# Patient Record
Sex: FEMALE | Race: WHITE
[De-identification: names, ages, dates, MRNs, and addresses within clinical notes are randomized per-mention and may not be internally consistent; named-entity substitution may affect disease eponyms.]

---

## 2017-02-02 ENCOUNTER — HOSPITAL ENCOUNTER (OUTPATIENT)
Dept: HOSPITAL 17 - HRAD | Age: 57
Discharge: HOME | End: 2017-02-02
Attending: SURGERY
Payer: COMMERCIAL

## 2017-02-02 VITALS — WEIGHT: 195.33 LBS | HEIGHT: 66 IN | BODY MASS INDEX: 31.39 KG/M2

## 2017-02-02 VITALS
RESPIRATION RATE: 16 BRPM | OXYGEN SATURATION: 100 % | DIASTOLIC BLOOD PRESSURE: 60 MMHG | SYSTOLIC BLOOD PRESSURE: 101 MMHG | HEART RATE: 61 BPM

## 2017-02-02 VITALS
HEART RATE: 61 BPM | DIASTOLIC BLOOD PRESSURE: 60 MMHG | OXYGEN SATURATION: 100 % | RESPIRATION RATE: 16 BRPM | SYSTOLIC BLOOD PRESSURE: 101 MMHG

## 2017-02-02 VITALS
SYSTOLIC BLOOD PRESSURE: 123 MMHG | TEMPERATURE: 98.3 F | DIASTOLIC BLOOD PRESSURE: 79 MMHG | OXYGEN SATURATION: 95 % | HEART RATE: 87 BPM | RESPIRATION RATE: 20 BRPM

## 2017-02-02 VITALS
HEART RATE: 68 BPM | SYSTOLIC BLOOD PRESSURE: 99 MMHG | DIASTOLIC BLOOD PRESSURE: 62 MMHG | OXYGEN SATURATION: 100 % | RESPIRATION RATE: 16 BRPM

## 2017-02-02 VITALS
TEMPERATURE: 97.6 F | OXYGEN SATURATION: 100 % | SYSTOLIC BLOOD PRESSURE: 108 MMHG | RESPIRATION RATE: 16 BRPM | HEART RATE: 66 BPM | DIASTOLIC BLOOD PRESSURE: 66 MMHG

## 2017-02-02 DIAGNOSIS — L76.34: Primary | ICD-10-CM

## 2017-02-02 PROCEDURE — 99153 MOD SED SAME PHYS/QHP EA: CPT

## 2017-02-02 PROCEDURE — 10022: CPT

## 2017-02-02 PROCEDURE — C1729 CATH, DRAINAGE: HCPCS

## 2017-02-02 PROCEDURE — 77012 CT SCAN FOR NEEDLE BIOPSY: CPT

## 2017-02-02 PROCEDURE — 99152 MOD SED SAME PHYS/QHP 5/>YRS: CPT

## 2017-02-02 NOTE — RADRPT
EXAM DATE/TIME:  02/02/2017 08:43 

 

[facility] COMPARISON:     

No previous studies available for comparison.

 

 

INDICATIONS :      

Abdominal seroma.

                     

 

 

SEDATION TIME:       

45 minutes

                     

 

MEDICATION(S):

1.) 3 mg midazolam (Versed) IV  

2.) 150 mcg fentanyl (Sublimaze) IV  

 

 

DEVICE(S):

1.) 14 Fr Skater

2.) 18 gauge 10 cm Woodward needle 

                     

 

FLUID: 

Total volume of 1000 cc of viscous reddish black fluid was removed.

Fluid was discarded.

                     

 

MEDICAL HISTORY :        

Endometrial cancer, incisional hernia

 

SURGICAL HISTORY :     

Hysterectomy.   Incisional hernia repair

 

ENCOUNTER:     

Initial

 

ACUITY:     

1 week

 

PAIN SCORE:     

4/10

 

LOCATION:     

Bilateral abdomen

                     

 

 

PROCEDURE:

1.)   Conscious sedation with continuous EKG and oximetry monitoring.

 

PROCEDURE :     

CT guided aspiration of ventral wall seroma

 

The risks, benefits and alternatives to the procedure were explained and verbal and written consent w
as obtained.  The site was prepped in sterile fashion.  Full sterile technique was used, including ca
p, mask, sterile gloves and gown and a large sterile sheet.  Hand hygiene and 2% chlorhexidine and/or
 betadine/alcohol prep was utilized per protocol for cutaneous antisepsis.  The skin and subcutaneous
 tissues were infiltrated with local anesthetic solution.

 

Needle was placed within the ventral wall seroma. Thick reddish black fluid aspirated. A 14 gauge cat
heter was placed within the stroma due to the viscosity the fluid. The seroma eventually drained and 
decompressed. Catheter was removed. Patient tolerated the procedure without subluxations.

 

CONCLUSION:     Uncomplicated drainage of ventral wall seroma as above.

 

 

 

 Saeed Suazo MD on February 02, 2017 at 15:21           

Board Certified Radiologist.

 This report was verified electronically.

## 2017-03-30 ENCOUNTER — HOSPITAL ENCOUNTER (OUTPATIENT)
Dept: HOSPITAL 17 - HRAD | Age: 57
Discharge: HOME | End: 2017-03-30
Attending: SURGERY
Payer: COMMERCIAL

## 2017-03-30 VITALS
SYSTOLIC BLOOD PRESSURE: 133 MMHG | HEART RATE: 78 BPM | RESPIRATION RATE: 18 BRPM | DIASTOLIC BLOOD PRESSURE: 78 MMHG | TEMPERATURE: 97 F | OXYGEN SATURATION: 97 %

## 2017-03-30 DIAGNOSIS — L76.82: Primary | ICD-10-CM

## 2017-03-30 DIAGNOSIS — Z85.42: ICD-10-CM

## 2017-03-30 PROCEDURE — 76942 ECHO GUIDE FOR BIOPSY: CPT

## 2017-03-30 PROCEDURE — C1729 CATH, DRAINAGE: HCPCS

## 2017-03-30 PROCEDURE — 10160 PNXR ASPIR ABSC HMTMA BULLA: CPT

## 2017-03-31 NOTE — RADRPT
EXAM DATE/TIME:  03/30/2017 10:16 

 

[facility] COMPARISON:     

No previous studies available for comparison.

 

 

INDICATIONS :      

Abdominal wall seroma.

                 

                 

 

 

 

MEDICAL HISTORY :        

Endometrial cancer.

 

SURGICAL HISTORY :        

Hysterectomy. Hernia repair. Lysis adhesion removal. 

 

ENCOUNTER:     

Subsequent

 

ACUITY:     

2 weeks

 

PAIN SCORE:     

1/10

 

LOCATION:      

Midline abdominal wall. 

                 

 

FLUID:

Total volume of 230 cc of dark brown. fluid was removed.

Fluid was discarded. 

Post procedure scanning reveals no hematoma or other complication.

 

 

TECHNIQUE:  

1. Ultrasound guidance for needle aspiration.

2. Aspiration.

 

The risks, benefits, and alternatives to ultrasound guided aspiration were explained to the patient i
n detail including the risk of bleeding and infection.  Written and verbal informed consent was obtai
peggy.  

 

With the patient on the ultrasound table, ultrasound imaging was used to select the most appropriate 
approach for aspiration.  Overlying skin was prepped and draped in the usual sterile fashion and with
 local anesthetic a dermatotomy was made with an 11 blade scalpel.  A catheter was introduced into th
e cavity and fluid was collected.   

 

CONCLUSION:     

Uncomplicated ultrasound guided aspiration.  

 

 

 

 Chacorta Allen MD on March 31, 2017 at 11:43           

Board Certified Radiologist.

 This report was verified electronically.

## 2017-07-19 ENCOUNTER — HOSPITAL ENCOUNTER (OUTPATIENT)
Dept: HOSPITAL 17 - HRAD | Age: 57
Discharge: HOME | End: 2017-07-19
Attending: SURGERY
Payer: COMMERCIAL

## 2017-07-19 VITALS
RESPIRATION RATE: 20 BRPM | OXYGEN SATURATION: 99 % | SYSTOLIC BLOOD PRESSURE: 104 MMHG | HEART RATE: 60 BPM | DIASTOLIC BLOOD PRESSURE: 62 MMHG

## 2017-07-19 VITALS
SYSTOLIC BLOOD PRESSURE: 110 MMHG | OXYGEN SATURATION: 99 % | HEART RATE: 54 BPM | TEMPERATURE: 96.4 F | RESPIRATION RATE: 20 BRPM | DIASTOLIC BLOOD PRESSURE: 70 MMHG

## 2017-07-19 VITALS
DIASTOLIC BLOOD PRESSURE: 86 MMHG | TEMPERATURE: 96.8 F | RESPIRATION RATE: 16 BRPM | SYSTOLIC BLOOD PRESSURE: 132 MMHG | HEART RATE: 80 BPM | OXYGEN SATURATION: 97 %

## 2017-07-19 DIAGNOSIS — Z92.21: ICD-10-CM

## 2017-07-19 DIAGNOSIS — Z85.42: ICD-10-CM

## 2017-07-19 DIAGNOSIS — Z90.710: ICD-10-CM

## 2017-07-19 DIAGNOSIS — N99.842: ICD-10-CM
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
## 2017-07-19 DIAGNOSIS — T88.8XXA: Primary | ICD-10-CM

## 2017-07-19 PROCEDURE — 75989 ABSCESS DRAINAGE UNDER X-RAY: CPT

## 2017-07-19 PROCEDURE — 10160 PNXR ASPIR ABSC HMTMA BULLA: CPT

## 2017-07-19 PROCEDURE — C1769 GUIDE WIRE: HCPCS

## 2017-07-19 PROCEDURE — C1729 CATH, DRAINAGE: HCPCS

## 2017-07-19 NOTE — RADRPT
EXAM DATE/TIME:  07/19/2017 10:57 

 

[facility] COMPARISON:     

No previous studies available for comparison.

 

EXTERNAL COMPARISON:  

[facility], CT ABDOMEN AND PELVIS , Dec 8 2016

 

 

INDICATIONS :      

Abdominal seroma.

 

 

MEDICAL HISTORY :        

Abdominal seroma. Endometrial cancer. Chemotherapy. 

 

SURGICAL HISTORY :     

Hysterectomy.   

 

ENCOUNTER:     

Subsequent

 

ACUITY:     

3 months

 

PAIN SCORE:     

2/10

 

LOCATION:      

Abdomen.

                     

 

FLUID:

Total volume of 35 cc of cloudy, red fluid was removed.

Fluid was discarded. 

Post procedure scanning reveals no hematoma or other complication.

 

 

TECHNIQUE:  

1.  Ultrasound guidance for abscess drainage.

2.  Abscess drainage.

 

The risks, benefits, and alternatives to ultrasound guided abscess drainage were explained to the [facility]
ient in detail including the risk of bleeding and infection.  Written and verbal informed consent was
 obtained.  

 

With the patient on the ultrasound table, ultrasound imaging was used to select the most appropriate 
approach for drainage.  Overlying skin was prepped and draped in the usual sterile fashion and with l
ocal anesthetic a dermatotomy was made with an 11 blade scalpel.  A 7 Tanzanian pigtail catheter was int
roduced into the cavity and 35 cc of serosanguineous fluid was collected. Iodine solution was injecte
d into the seroma which was eventually evacuated. The 7 Tanzanian pigtail catheter was then removed.

 

Post procedure scanning reveals no hematoma or other complication. 

The patient tolerated the procedure well and the left the ultrasound suite in stable condition.

 

CONCLUSION:     

Uncomplicated placement of a 7 Tanzanian pigtail catheter within the abdominal wall seroma. Sclerosing a
gent was then administered. The pigtail catheter and sclerosing agent was then evacuated.  

 

 

 

 Saeed Suazo MD on July 19, 2017 at 15:41           

Board Certified Radiologist.

 This report was verified electronically.

## 2018-02-01 ENCOUNTER — HOSPITAL ENCOUNTER (OUTPATIENT)
Dept: HOSPITAL 17 - CPRE | Age: 58
End: 2018-02-01
Attending: COLON & RECTAL SURGERY
Payer: COMMERCIAL

## 2018-02-01 DIAGNOSIS — C20: ICD-10-CM

## 2018-02-01 DIAGNOSIS — Z01.812: Primary | ICD-10-CM

## 2018-02-01 DIAGNOSIS — R82.99: ICD-10-CM

## 2018-02-01 LAB
BACTERIA #/AREA URNS HPF: (no result) /HPF
COLOR UR: YELLOW
GLUCOSE UR STRIP-MCNC: (no result) MG/DL
HGB UR QL STRIP: (no result)
KETONES UR STRIP-MCNC: (no result) MG/DL
MUCOUS THREADS #/AREA URNS LPF: (no result) /LPF
NITRITE UR QL STRIP: (no result)
SP GR UR STRIP: 1.03 (ref 1–1.03)
SQUAMOUS #/AREA URNS HPF: 2 /HPF (ref 0–5)
URINE LEUKOCYTE ESTERASE: (no result)

## 2018-02-01 PROCEDURE — 82378 CARCINOEMBRYONIC ANTIGEN: CPT

## 2018-02-01 PROCEDURE — 87086 URINE CULTURE/COLONY COUNT: CPT

## 2018-02-01 PROCEDURE — 81001 URINALYSIS AUTO W/SCOPE: CPT

## 2018-02-01 PROCEDURE — 36415 COLL VENOUS BLD VENIPUNCTURE: CPT

## 2018-02-09 ENCOUNTER — HOSPITAL ENCOUNTER (INPATIENT)
Dept: HOSPITAL 17 - HSDI | Age: 58
LOS: 4 days | Discharge: HOME HEALTH | DRG: 331 | End: 2018-02-13
Attending: COLON & RECTAL SURGERY | Admitting: COLON & RECTAL SURGERY
Payer: COMMERCIAL

## 2018-02-09 VITALS
TEMPERATURE: 98.1 F | OXYGEN SATURATION: 94 % | HEART RATE: 95 BPM | RESPIRATION RATE: 20 BRPM | SYSTOLIC BLOOD PRESSURE: 130 MMHG | DIASTOLIC BLOOD PRESSURE: 62 MMHG

## 2018-02-09 VITALS
HEART RATE: 95 BPM | SYSTOLIC BLOOD PRESSURE: 125 MMHG | RESPIRATION RATE: 16 BRPM | DIASTOLIC BLOOD PRESSURE: 62 MMHG | OXYGEN SATURATION: 99 %

## 2018-02-09 VITALS — HEIGHT: 66 IN | BODY MASS INDEX: 29.94 KG/M2 | WEIGHT: 186.29 LBS

## 2018-02-09 DIAGNOSIS — C20: Primary | ICD-10-CM

## 2018-02-09 LAB
BASOPHILS # BLD AUTO: 0 TH/MM3 (ref 0–0.2)
BASOPHILS NFR BLD: 0.1 % (ref 0–2)
BUN SERPL-MCNC: 12 MG/DL (ref 7–18)
CALCIUM SERPL-MCNC: 7.5 MG/DL (ref 8.5–10.1)
CHLORIDE SERPL-SCNC: 105 MEQ/L (ref 98–107)
CREAT SERPL-MCNC: 0.77 MG/DL (ref 0.5–1)
EOSINOPHIL # BLD: 0 TH/MM3 (ref 0–0.4)
EOSINOPHIL NFR BLD: 0 % (ref 0–4)
ERYTHROCYTE [DISTWIDTH] IN BLOOD BY AUTOMATED COUNT: 14.8 % (ref 11.6–17.2)
GFR SERPLBLD BASED ON 1.73 SQ M-ARVRAT: 78 ML/MIN (ref 89–?)
GLUCOSE SERPL-MCNC: 150 MG/DL (ref 74–106)
HCO3 BLD-SCNC: 23.2 MEQ/L (ref 21–32)
HCT VFR BLD CALC: 36.9 % (ref 35–46)
HGB BLD-MCNC: 12.4 GM/DL (ref 11.6–15.3)
LYMPHOCYTES # BLD AUTO: 0.4 TH/MM3 (ref 1–4.8)
LYMPHOCYTES NFR BLD AUTO: 2.2 % (ref 9–44)
MCH RBC QN AUTO: 30.9 PG (ref 27–34)
MCHC RBC AUTO-ENTMCNC: 33.7 % (ref 32–36)
MCV RBC AUTO: 91.7 FL (ref 80–100)
MONOCYTE #: 0.2 TH/MM3 (ref 0–0.9)
MONOCYTES NFR BLD: 1.2 % (ref 0–8)
NEUTROPHILS # BLD AUTO: 19.6 TH/MM3 (ref 1.8–7.7)
NEUTROPHILS NFR BLD AUTO: 96.5 % (ref 16–70)
PLATELET # BLD: 324 TH/MM3 (ref 150–450)
PMV BLD AUTO: 7 FL (ref 7–11)
RBC # BLD AUTO: 4.03 MIL/MM3 (ref 4–5.3)
SODIUM SERPL-SCNC: 139 MEQ/L (ref 136–145)
WBC # BLD AUTO: 20.3 TH/MM3 (ref 4–11)

## 2018-02-09 PROCEDURE — 85025 COMPLETE CBC W/AUTO DIFF WBC: CPT

## 2018-02-09 PROCEDURE — 0T9B70Z DRAINAGE OF BLADDER WITH DRAINAGE DEVICE, VIA NATURAL OR ARTIFICIAL OPENING: ICD-10-PCS

## 2018-02-09 PROCEDURE — 86850 RBC ANTIBODY SCREEN: CPT

## 2018-02-09 PROCEDURE — 0T788DZ DILATION OF BILATERAL URETERS WITH INTRALUMINAL DEVICE, VIA NATURAL OR ARTIFICIAL OPENING ENDOSCOPIC: ICD-10-PCS

## 2018-02-09 PROCEDURE — 0UBG4ZX EXCISION OF VAGINA, PERCUTANEOUS ENDOSCOPIC APPROACH, DIAGNOSTIC: ICD-10-PCS | Performed by: COLON & RECTAL SURGERY

## 2018-02-09 PROCEDURE — 0DBN4ZZ EXCISION OF SIGMOID COLON, PERCUTANEOUS ENDOSCOPIC APPROACH: ICD-10-PCS | Performed by: COLON & RECTAL SURGERY

## 2018-02-09 PROCEDURE — 86901 BLOOD TYPING SEROLOGIC RH(D): CPT

## 2018-02-09 PROCEDURE — 88309 TISSUE EXAM BY PATHOLOGIST: CPT

## 2018-02-09 PROCEDURE — 07BC4ZX EXCISION OF PELVIS LYMPHATIC, PERCUTANEOUS ENDOSCOPIC APPROACH, DIAGNOSTIC: ICD-10-PCS | Performed by: COLON & RECTAL SURGERY

## 2018-02-09 PROCEDURE — 86900 BLOOD TYPING SEROLOGIC ABO: CPT

## 2018-02-09 PROCEDURE — 0DBQ4ZZ EXCISION OF ANUS, PERCUTANEOUS ENDOSCOPIC APPROACH: ICD-10-PCS | Performed by: COLON & RECTAL SURGERY

## 2018-02-09 PROCEDURE — 0DTP4ZZ RESECTION OF RECTUM, PERCUTANEOUS ENDOSCOPIC APPROACH: ICD-10-PCS | Performed by: COLON & RECTAL SURGERY

## 2018-02-09 PROCEDURE — 94150 VITAL CAPACITY TEST: CPT

## 2018-02-09 PROCEDURE — 80048 BASIC METABOLIC PNL TOTAL CA: CPT

## 2018-02-09 PROCEDURE — C9113 INJ PANTOPRAZOLE SODIUM, VIA: HCPCS

## 2018-02-09 PROCEDURE — 8E0W4CZ ROBOTIC ASSISTED PROCEDURE OF TRUNK REGION, PERCUTANEOUS ENDOSCOPIC APPROACH: ICD-10-PCS | Performed by: COLON & RECTAL SURGERY

## 2018-02-09 PROCEDURE — 84155 ASSAY OF PROTEIN SERUM: CPT

## 2018-02-09 PROCEDURE — 0D1N0Z4 BYPASS SIGMOID COLON TO CUTANEOUS, OPEN APPROACH: ICD-10-PCS | Performed by: COLON & RECTAL SURGERY

## 2018-02-09 RX ADMIN — CLONIDINE HYDROCHLORIDE SCH MG: 0.1 INJECTION, SOLUTION EPIDURAL at 22:22

## 2018-02-09 RX ADMIN — DEXTROSE MONOHYDRATE, SODIUM CHLORIDE, AND POTASSIUM CHLORIDE SCH MLS/HR: 50; 9; 1.49 INJECTION, SOLUTION INTRAVENOUS at 22:35

## 2018-02-09 NOTE — PD.OP
__________________________________________________





Operative Report


Date of Surgery:  Feb 9, 2018


Preoperative Diagnosis:  


Rectal cancer


Postoperative Diagnosis:  


Same


Procedure:


Cystoscopy with bilateral ureteral catheter insertion


Anesthesia:


AUSTEN


Surgeon:


Rinku Meyers


Assistant(s):


None


Resident Surgeon:


None


Operation and Findings:


57-year-old female with evidence of rectal cancer who is undergoing robotic 

anterior perineal resection by Dr. De La O.  Request made for bilateral 

ureteral catheter placement.  The patient is brought to the operating room and 

was placed in the dorsal lithotomy position.  She was prepped and draped in 

usual sterile fashion, preprocedure antibiotics were administered, and general 

endotracheal tube anesthesia was administered.  22 Togolese cystoscope was 

inserted in the bladder pan cystoscopy did not reveal any abnormalities.  The 

left ureteral orifice was identified and a 5 Togolese catheter was inserted into 

the left ureteral orifice without difficulty.  This was again repeated on the 

right side without difficulty.  A Vitale was inserted and the catheters were 

attached to the Vitale.  She tolerated procedure well.











Rinku Meyers DO Feb 9, 2018 16:25

## 2018-02-10 VITALS
HEART RATE: 75 BPM | TEMPERATURE: 98 F | OXYGEN SATURATION: 98 % | DIASTOLIC BLOOD PRESSURE: 51 MMHG | SYSTOLIC BLOOD PRESSURE: 108 MMHG

## 2018-02-10 VITALS
SYSTOLIC BLOOD PRESSURE: 117 MMHG | OXYGEN SATURATION: 99 % | RESPIRATION RATE: 19 BRPM | HEART RATE: 83 BPM | DIASTOLIC BLOOD PRESSURE: 59 MMHG

## 2018-02-10 VITALS
HEART RATE: 87 BPM | OXYGEN SATURATION: 98 % | RESPIRATION RATE: 18 BRPM | DIASTOLIC BLOOD PRESSURE: 60 MMHG | SYSTOLIC BLOOD PRESSURE: 109 MMHG

## 2018-02-10 VITALS
SYSTOLIC BLOOD PRESSURE: 106 MMHG | TEMPERATURE: 98.2 F | OXYGEN SATURATION: 96 % | DIASTOLIC BLOOD PRESSURE: 56 MMHG | HEART RATE: 65 BPM

## 2018-02-10 VITALS
RESPIRATION RATE: 17 BRPM | SYSTOLIC BLOOD PRESSURE: 131 MMHG | OXYGEN SATURATION: 99 % | HEART RATE: 71 BPM | DIASTOLIC BLOOD PRESSURE: 62 MMHG

## 2018-02-10 VITALS
OXYGEN SATURATION: 99 % | RESPIRATION RATE: 18 BRPM | DIASTOLIC BLOOD PRESSURE: 60 MMHG | SYSTOLIC BLOOD PRESSURE: 120 MMHG | HEART RATE: 73 BPM

## 2018-02-10 VITALS
RESPIRATION RATE: 18 BRPM | TEMPERATURE: 97.8 F | OXYGEN SATURATION: 97 % | DIASTOLIC BLOOD PRESSURE: 54 MMHG | HEART RATE: 73 BPM | SYSTOLIC BLOOD PRESSURE: 95 MMHG

## 2018-02-10 VITALS
DIASTOLIC BLOOD PRESSURE: 59 MMHG | HEART RATE: 71 BPM | OXYGEN SATURATION: 97 % | SYSTOLIC BLOOD PRESSURE: 118 MMHG | RESPIRATION RATE: 16 BRPM

## 2018-02-10 VITALS
OXYGEN SATURATION: 97 % | HEART RATE: 77 BPM | TEMPERATURE: 97.7 F | RESPIRATION RATE: 18 BRPM | DIASTOLIC BLOOD PRESSURE: 59 MMHG | SYSTOLIC BLOOD PRESSURE: 109 MMHG

## 2018-02-10 VITALS
RESPIRATION RATE: 16 BRPM | SYSTOLIC BLOOD PRESSURE: 91 MMHG | HEART RATE: 74 BPM | OXYGEN SATURATION: 97 % | DIASTOLIC BLOOD PRESSURE: 64 MMHG | TEMPERATURE: 98 F

## 2018-02-10 VITALS
OXYGEN SATURATION: 94 % | DIASTOLIC BLOOD PRESSURE: 59 MMHG | SYSTOLIC BLOOD PRESSURE: 120 MMHG | RESPIRATION RATE: 20 BRPM | TEMPERATURE: 97.8 F | HEART RATE: 70 BPM

## 2018-02-10 VITALS
HEART RATE: 80 BPM | RESPIRATION RATE: 16 BRPM | OXYGEN SATURATION: 99 % | SYSTOLIC BLOOD PRESSURE: 113 MMHG | DIASTOLIC BLOOD PRESSURE: 57 MMHG

## 2018-02-10 VITALS
SYSTOLIC BLOOD PRESSURE: 128 MMHG | RESPIRATION RATE: 18 BRPM | OXYGEN SATURATION: 97 % | HEART RATE: 79 BPM | DIASTOLIC BLOOD PRESSURE: 61 MMHG

## 2018-02-10 VITALS
RESPIRATION RATE: 16 BRPM | SYSTOLIC BLOOD PRESSURE: 119 MMHG | DIASTOLIC BLOOD PRESSURE: 59 MMHG | OXYGEN SATURATION: 98 % | HEART RATE: 72 BPM

## 2018-02-10 VITALS
DIASTOLIC BLOOD PRESSURE: 63 MMHG | SYSTOLIC BLOOD PRESSURE: 120 MMHG | HEART RATE: 83 BPM | OXYGEN SATURATION: 100 % | RESPIRATION RATE: 20 BRPM

## 2018-02-10 VITALS — OXYGEN SATURATION: 99 %

## 2018-02-10 LAB
BASOPHILS # BLD AUTO: 0 TH/MM3 (ref 0–0.2)
BASOPHILS NFR BLD: 0.1 % (ref 0–2)
BUN SERPL-MCNC: 11 MG/DL (ref 7–18)
CALCIUM SERPL-MCNC: 7.8 MG/DL (ref 8.5–10.1)
CHLORIDE SERPL-SCNC: 106 MEQ/L (ref 98–107)
CREAT SERPL-MCNC: 0.86 MG/DL (ref 0.5–1)
EOSINOPHIL # BLD: 0 TH/MM3 (ref 0–0.4)
EOSINOPHIL NFR BLD: 0 % (ref 0–4)
ERYTHROCYTE [DISTWIDTH] IN BLOOD BY AUTOMATED COUNT: 14.7 % (ref 11.6–17.2)
GFR SERPLBLD BASED ON 1.73 SQ M-ARVRAT: 68 ML/MIN (ref 89–?)
GLUCOSE SERPL-MCNC: 183 MG/DL (ref 74–106)
HCO3 BLD-SCNC: 25.7 MEQ/L (ref 21–32)
HCT VFR BLD CALC: 34.6 % (ref 35–46)
HGB BLD-MCNC: 12 GM/DL (ref 11.6–15.3)
LYMPHOCYTES # BLD AUTO: 0.4 TH/MM3 (ref 1–4.8)
LYMPHOCYTES NFR BLD AUTO: 2.4 % (ref 9–44)
MCH RBC QN AUTO: 31.7 PG (ref 27–34)
MCHC RBC AUTO-ENTMCNC: 34.6 % (ref 32–36)
MCV RBC AUTO: 91.8 FL (ref 80–100)
MONOCYTE #: 0.5 TH/MM3 (ref 0–0.9)
MONOCYTES NFR BLD: 3.3 % (ref 0–8)
NEUTROPHILS # BLD AUTO: 14.9 TH/MM3 (ref 1.8–7.7)
NEUTROPHILS NFR BLD AUTO: 94.2 % (ref 16–70)
PLATELET # BLD: 310 TH/MM3 (ref 150–450)
PMV BLD AUTO: 7.1 FL (ref 7–11)
RBC # BLD AUTO: 3.77 MIL/MM3 (ref 4–5.3)
SODIUM SERPL-SCNC: 139 MEQ/L (ref 136–145)
WBC # BLD AUTO: 15.8 TH/MM3 (ref 4–11)

## 2018-02-10 RX ADMIN — CLONIDINE HYDROCHLORIDE SCH MG: 0.1 INJECTION, SOLUTION EPIDURAL at 08:38

## 2018-02-10 RX ADMIN — SODIUM CHLORIDE SCH MLS/HR: 900 INJECTION, SOLUTION INTRAVENOUS at 09:08

## 2018-02-10 RX ADMIN — DEXTROSE MONOHYDRATE, SODIUM CHLORIDE, AND POTASSIUM CHLORIDE SCH MLS/HR: 50; 9; 1.49 INJECTION, SOLUTION INTRAVENOUS at 11:06

## 2018-02-10 RX ADMIN — SODIUM CHLORIDE SCH MLS/HR: 900 INJECTION, SOLUTION INTRAVENOUS at 01:28

## 2018-02-10 RX ADMIN — DEXTROSE MONOHYDRATE, SODIUM CHLORIDE, AND POTASSIUM CHLORIDE SCH MLS/HR: 50; 9; 1.49 INJECTION, SOLUTION INTRAVENOUS at 23:39

## 2018-02-10 RX ADMIN — CLONIDINE HYDROCHLORIDE SCH MG: 0.1 INJECTION, SOLUTION EPIDURAL at 16:36

## 2018-02-10 RX ADMIN — SODIUM CHLORIDE SCH MLS/HR: 900 INJECTION, SOLUTION INTRAVENOUS at 16:36

## 2018-02-10 RX ADMIN — PANTOPRAZOLE SODIUM SCH MG: 40 INJECTION, POWDER, FOR SOLUTION INTRAVENOUS at 09:09

## 2018-02-10 RX ADMIN — CLONIDINE HYDROCHLORIDE SCH MG: 0.1 INJECTION, SOLUTION EPIDURAL at 21:09

## 2018-02-10 RX ADMIN — HEPARIN SODIUM SCH UNITS: 10000 INJECTION, SOLUTION INTRAVENOUS; SUBCUTANEOUS at 21:09

## 2018-02-10 RX ADMIN — CLONIDINE HYDROCHLORIDE SCH MG: 0.1 INJECTION, SOLUTION EPIDURAL at 09:09

## 2018-02-10 RX ADMIN — DEXTROSE MONOHYDRATE, SODIUM CHLORIDE, AND POTASSIUM CHLORIDE SCH MLS/HR: 50; 9; 1.49 INJECTION, SOLUTION INTRAVENOUS at 08:37

## 2018-02-10 RX ADMIN — SODIUM BICARBONATE SCH MLS/HR: 84 INJECTION, SOLUTION INTRAVENOUS at 11:28

## 2018-02-10 RX ADMIN — SODIUM BICARBONATE SCH MLS/HR: 84 INJECTION, SOLUTION INTRAVENOUS at 08:37

## 2018-02-10 NOTE — HHI.PR
Subjective


Remarks


C/R Surg POD #1





afebrile, VSS


UO good, bloody





Objective


-





Vital Signs








  Date Time  Temp Pulse Resp B/P (MAP) Pulse Ox O2 Delivery O2 Flow Rate FiO2


 


2/10/18 10:03   16     


 


2/10/18 08:06     99 Nasal Cannula 2.00 


 


2/10/18 08:00 97.7 77  109/59 (76)    








Result Diagram:  


2/10/18 0339                                                                   

             2/10/18 0339





Objective Remarks


PE





alert





Abd - soft, mild tympany, stoma pink





A/P


Assessment and Plan


Imp:





stable p[ost-op


OOB


PO liq


tx to floor











Parish Garnett MD Feb 10, 2018 10:49

## 2018-02-11 VITALS
TEMPERATURE: 98 F | DIASTOLIC BLOOD PRESSURE: 58 MMHG | RESPIRATION RATE: 16 BRPM | OXYGEN SATURATION: 95 % | SYSTOLIC BLOOD PRESSURE: 110 MMHG | HEART RATE: 77 BPM

## 2018-02-11 VITALS
OXYGEN SATURATION: 98 % | HEART RATE: 90 BPM | SYSTOLIC BLOOD PRESSURE: 147 MMHG | DIASTOLIC BLOOD PRESSURE: 70 MMHG | TEMPERATURE: 98.8 F

## 2018-02-11 VITALS
DIASTOLIC BLOOD PRESSURE: 55 MMHG | OXYGEN SATURATION: 96 % | HEART RATE: 99 BPM | SYSTOLIC BLOOD PRESSURE: 111 MMHG | TEMPERATURE: 98.2 F | RESPIRATION RATE: 16 BRPM

## 2018-02-11 VITALS
DIASTOLIC BLOOD PRESSURE: 60 MMHG | OXYGEN SATURATION: 94 % | HEART RATE: 92 BPM | TEMPERATURE: 98.6 F | SYSTOLIC BLOOD PRESSURE: 123 MMHG

## 2018-02-11 VITALS
RESPIRATION RATE: 18 BRPM | TEMPERATURE: 98 F | SYSTOLIC BLOOD PRESSURE: 120 MMHG | OXYGEN SATURATION: 99 % | DIASTOLIC BLOOD PRESSURE: 62 MMHG | HEART RATE: 99 BPM

## 2018-02-11 VITALS
TEMPERATURE: 98.4 F | OXYGEN SATURATION: 98 % | DIASTOLIC BLOOD PRESSURE: 58 MMHG | SYSTOLIC BLOOD PRESSURE: 105 MMHG | HEART RATE: 75 BPM

## 2018-02-11 VITALS — OXYGEN SATURATION: 97 %

## 2018-02-11 LAB
BASOPHILS # BLD AUTO: 0 TH/MM3 (ref 0–0.2)
BASOPHILS NFR BLD: 0.3 % (ref 0–2)
BUN SERPL-MCNC: 9 MG/DL (ref 7–18)
CALCIUM SERPL-MCNC: 7.4 MG/DL (ref 8.5–10.1)
CALCIUM TP COR SERPL-MCNC: 8.3 MG/DL (ref 8.5–10.1)
CHLORIDE SERPL-SCNC: 110 MEQ/L (ref 98–107)
CREAT SERPL-MCNC: 0.57 MG/DL (ref 0.5–1)
EOSINOPHIL # BLD: 0.2 TH/MM3 (ref 0–0.4)
EOSINOPHIL NFR BLD: 3.4 % (ref 0–4)
ERYTHROCYTE [DISTWIDTH] IN BLOOD BY AUTOMATED COUNT: 14.9 % (ref 11.6–17.2)
GFR SERPLBLD BASED ON 1.73 SQ M-ARVRAT: 110 ML/MIN (ref 89–?)
GLUCOSE SERPL-MCNC: 111 MG/DL (ref 74–106)
HCO3 BLD-SCNC: 25.4 MEQ/L (ref 21–32)
HCT VFR BLD CALC: 28.4 % (ref 35–46)
HGB BLD-MCNC: 9.8 GM/DL (ref 11.6–15.3)
LYMPHOCYTES # BLD AUTO: 0.8 TH/MM3 (ref 1–4.8)
LYMPHOCYTES NFR BLD AUTO: 11.7 % (ref 9–44)
MCH RBC QN AUTO: 32.4 PG (ref 27–34)
MCHC RBC AUTO-ENTMCNC: 34.4 % (ref 32–36)
MCV RBC AUTO: 94.1 FL (ref 80–100)
MONOCYTE #: 0.4 TH/MM3 (ref 0–0.9)
MONOCYTES NFR BLD: 6.3 % (ref 0–8)
NEUTROPHILS # BLD AUTO: 5.3 TH/MM3 (ref 1.8–7.7)
NEUTROPHILS NFR BLD AUTO: 78.3 % (ref 16–70)
PLATELET # BLD: 228 TH/MM3 (ref 150–450)
PMV BLD AUTO: 7.1 FL (ref 7–11)
PROT SERPL-MCNC: 5.5 GM/DL (ref 6.4–8.2)
RBC # BLD AUTO: 3.02 MIL/MM3 (ref 4–5.3)
SODIUM SERPL-SCNC: 141 MEQ/L (ref 136–145)
WBC # BLD AUTO: 6.7 TH/MM3 (ref 4–11)

## 2018-02-11 RX ADMIN — HEPARIN SODIUM SCH UNITS: 10000 INJECTION, SOLUTION INTRAVENOUS; SUBCUTANEOUS at 07:53

## 2018-02-11 RX ADMIN — HEPARIN SODIUM SCH UNITS: 10000 INJECTION, SOLUTION INTRAVENOUS; SUBCUTANEOUS at 21:39

## 2018-02-11 RX ADMIN — CLONIDINE HYDROCHLORIDE SCH MG: 0.1 INJECTION, SOLUTION EPIDURAL at 10:51

## 2018-02-11 RX ADMIN — CLONIDINE HYDROCHLORIDE SCH MG: 0.1 INJECTION, SOLUTION EPIDURAL at 04:14

## 2018-02-11 RX ADMIN — DEXTROSE MONOHYDRATE, SODIUM CHLORIDE, AND POTASSIUM CHLORIDE SCH MLS/HR: 50; 9; 1.49 INJECTION, SOLUTION INTRAVENOUS at 12:55

## 2018-02-11 RX ADMIN — DEXTROSE MONOHYDRATE, SODIUM CHLORIDE, AND POTASSIUM CHLORIDE SCH MLS/HR: 50; 9; 1.49 INJECTION, SOLUTION INTRAVENOUS at 10:52

## 2018-02-11 RX ADMIN — CLONIDINE HYDROCHLORIDE SCH MG: 0.1 INJECTION, SOLUTION EPIDURAL at 22:00

## 2018-02-11 RX ADMIN — HYDROCODONE BITARTRATE AND ACETAMINOPHEN PRN TAB: 5; 325 TABLET ORAL at 22:00

## 2018-02-11 RX ADMIN — CLONIDINE HYDROCHLORIDE SCH MG: 0.1 INJECTION, SOLUTION EPIDURAL at 16:00

## 2018-02-11 RX ADMIN — PANTOPRAZOLE SODIUM SCH MG: 40 INJECTION, POWDER, FOR SOLUTION INTRAVENOUS at 07:53

## 2018-02-11 RX ADMIN — CLONIDINE HYDROCHLORIDE SCH MG: 0.1 INJECTION, SOLUTION EPIDURAL at 21:39

## 2018-02-11 NOTE — HHI.PR
Subjective


Remarks


C/R Surg POD #2





afebrile, VSS


UO good, bloody


+flatus





Objective


-





Vital Signs








  Date Time  Temp Pulse Resp B/P (MAP) Pulse Ox O2 Delivery O2 Flow Rate FiO2


 


2/11/18 08:00 98.0 77 16 110/58 (75) 95   


 


2/11/18 07:44        21


 


2/10/18 08:06      Nasal Cannula 2.00 








Result Diagram:  


2/11/18 0351                                                                   

             2/11/18 0351





Objective Remarks


PE





alert





Abd - soft, mild tympany, stoma pink, wound dry





A/P


Assessment and Plan


Imp:





stable post-op


OOB


PO liq, adv


tx to floor











Parish Garnett MD Feb 11, 2018 09:53

## 2018-02-12 VITALS
TEMPERATURE: 97.9 F | DIASTOLIC BLOOD PRESSURE: 61 MMHG | RESPIRATION RATE: 17 BRPM | OXYGEN SATURATION: 99 % | SYSTOLIC BLOOD PRESSURE: 125 MMHG | HEART RATE: 74 BPM

## 2018-02-12 VITALS
HEART RATE: 78 BPM | SYSTOLIC BLOOD PRESSURE: 136 MMHG | DIASTOLIC BLOOD PRESSURE: 68 MMHG | TEMPERATURE: 97.8 F | OXYGEN SATURATION: 97 %

## 2018-02-12 VITALS
SYSTOLIC BLOOD PRESSURE: 124 MMHG | OXYGEN SATURATION: 97 % | RESPIRATION RATE: 17 BRPM | DIASTOLIC BLOOD PRESSURE: 67 MMHG | HEART RATE: 71 BPM | TEMPERATURE: 97.8 F

## 2018-02-12 VITALS
SYSTOLIC BLOOD PRESSURE: 117 MMHG | HEART RATE: 88 BPM | OXYGEN SATURATION: 97 % | DIASTOLIC BLOOD PRESSURE: 55 MMHG | TEMPERATURE: 99 F | RESPIRATION RATE: 18 BRPM

## 2018-02-12 VITALS
HEART RATE: 87 BPM | SYSTOLIC BLOOD PRESSURE: 135 MMHG | RESPIRATION RATE: 18 BRPM | TEMPERATURE: 97.9 F | DIASTOLIC BLOOD PRESSURE: 70 MMHG | OXYGEN SATURATION: 99 %

## 2018-02-12 VITALS — OXYGEN SATURATION: 97 %

## 2018-02-12 LAB
BASOPHILS # BLD AUTO: 0 TH/MM3 (ref 0–0.2)
BASOPHILS NFR BLD: 0.6 % (ref 0–2)
BUN SERPL-MCNC: 6 MG/DL (ref 7–18)
CALCIUM SERPL-MCNC: 8 MG/DL (ref 8.5–10.1)
CHLORIDE SERPL-SCNC: 108 MEQ/L (ref 98–107)
CREAT SERPL-MCNC: 0.46 MG/DL (ref 0.5–1)
EOSINOPHIL # BLD: 0.2 TH/MM3 (ref 0–0.4)
EOSINOPHIL NFR BLD: 4 % (ref 0–4)
ERYTHROCYTE [DISTWIDTH] IN BLOOD BY AUTOMATED COUNT: 14.5 % (ref 11.6–17.2)
GFR SERPLBLD BASED ON 1.73 SQ M-ARVRAT: 141 ML/MIN (ref 89–?)
GLUCOSE SERPL-MCNC: 73 MG/DL (ref 74–106)
HCO3 BLD-SCNC: 24.1 MEQ/L (ref 21–32)
HCT VFR BLD CALC: 31.2 % (ref 35–46)
HGB BLD-MCNC: 10.8 GM/DL (ref 11.6–15.3)
LYMPHOCYTES # BLD AUTO: 0.7 TH/MM3 (ref 1–4.8)
LYMPHOCYTES NFR BLD AUTO: 12.1 % (ref 9–44)
MCH RBC QN AUTO: 32.6 PG (ref 27–34)
MCHC RBC AUTO-ENTMCNC: 34.7 % (ref 32–36)
MCV RBC AUTO: 93.8 FL (ref 80–100)
MONOCYTE #: 0.4 TH/MM3 (ref 0–0.9)
MONOCYTES NFR BLD: 6.9 % (ref 0–8)
NEUTROPHILS # BLD AUTO: 4.6 TH/MM3 (ref 1.8–7.7)
NEUTROPHILS NFR BLD AUTO: 76.4 % (ref 16–70)
PLATELET # BLD: 267 TH/MM3 (ref 150–450)
PMV BLD AUTO: 7.3 FL (ref 7–11)
RBC # BLD AUTO: 3.32 MIL/MM3 (ref 4–5.3)
SODIUM SERPL-SCNC: 140 MEQ/L (ref 136–145)
WBC # BLD AUTO: 6.1 TH/MM3 (ref 4–11)

## 2018-02-12 RX ADMIN — CLONIDINE HYDROCHLORIDE SCH MG: 0.1 INJECTION, SOLUTION EPIDURAL at 04:00

## 2018-02-12 RX ADMIN — HEPARIN SODIUM SCH UNITS: 10000 INJECTION, SOLUTION INTRAVENOUS; SUBCUTANEOUS at 09:11

## 2018-02-12 RX ADMIN — HYDROCODONE BITARTRATE AND ACETAMINOPHEN PRN TAB: 5; 325 TABLET ORAL at 04:04

## 2018-02-12 RX ADMIN — PANTOPRAZOLE SODIUM SCH MG: 40 INJECTION, POWDER, FOR SOLUTION INTRAVENOUS at 09:10

## 2018-02-12 RX ADMIN — HEPARIN SODIUM SCH UNITS: 10000 INJECTION, SOLUTION INTRAVENOUS; SUBCUTANEOUS at 20:18

## 2018-02-12 RX ADMIN — DEXTROSE MONOHYDRATE, SODIUM CHLORIDE, AND POTASSIUM CHLORIDE SCH MLS/HR: 50; 9; 1.49 INJECTION, SOLUTION INTRAVENOUS at 20:22

## 2018-02-12 RX ADMIN — CLONIDINE HYDROCHLORIDE SCH MG: 0.1 INJECTION, SOLUTION EPIDURAL at 16:00

## 2018-02-12 RX ADMIN — CLONIDINE HYDROCHLORIDE SCH MG: 0.1 INJECTION, SOLUTION EPIDURAL at 09:11

## 2018-02-12 NOTE — HHI.PR
Subjective


Remarks


POD#3 s/p robotic APR, ANYA





comfortable





Objective





Vital Signs








  Date Time  Temp Pulse Resp B/P (MAP) Pulse Ox O2 Delivery O2 Flow Rate FiO2


 


2/12/18 15:00 97.9 87 18 135/70 (91) 99   


 


2/12/18 11:00 97.9 74 17 125/61 (82) 99   


 


2/12/18 07:34     97   21


 


2/12/18 07:00 97.8 71 17 124/67 (86) 97   


 


2/12/18 05:11   16     


 


2/12/18 03:00 97.8 78  136/68 (90) 97   


 


2/11/18 23:00 98.6 92  123/60 (81) 94   


 


2/11/18 19:00 98.8 90  147/70 (95) 98   














I/O      


 


 2/11/18 2/11/18 2/11/18 2/12/18 2/12/18 2/12/18





 07:00 15:00 23:00 07:00 15:00 23:00


 


Intake Total 1200 ml  600 ml 500 ml  


 


Output Total 360 ml  650 ml 2300 ml  900 ml


 


Balance 840 ml  -50 ml -1800 ml  -900 ml


 


      


 


Intake Oral 240 ml  600 ml 500 ml  


 


IV Total 960 ml     


 


Output Urine Total 350 ml  450 ml 2100 ml  900 ml


 


Stool Total 10 ml  200 ml 200 ml  


 


# Sanitary Pads 2 Pads 1 Pads 1 Pads 2 Pads 1 Pads 1 Pads





  1 Pads 1 Pads  1 Pads 








Result Diagram:  


2/12/18 0426                                                                   

             2/12/18 0426





Objective Remarks


Abdomen soft, mild distension, tender


Wounds clean


Perineal wound - serous drainage, mild





Assessment and Plan


Assessment and Plan


D/C camejo


Advance diet


Home when [facility] arranged











Gena De La O MD Feb 12, 2018 17:49

## 2018-02-12 NOTE — PD.WCN.NOT
Wound Consult


Description:


Consult for NEW OSTOMY TEACHING of LLQ per Dr De La O


Communicated with:


Patient 


Patient  


Tash, RN


Recommendation:


Empty pouch when 1/3-1/2 full 


Change appliance (barrier and pouch) every 5-7 days and PRN for leaks


Additional Information:


Patient seen on [facility] for ostomy assessment and teaching. Full note to follow





Ostomy


Type:  Colostomy


Surgeon:  Gena De La O MD


Date of Surgery:  Feb 9, 2018


Complete:  Starter kit (verbal consent obtained), Education materials (left at 

bedside), Rx, Other (supplies ordered from [facility] for patient to go home with at 

discharge)


Educated patient on:


Stoma and skin care


Ostomy supplies


Food and drinks that cause more odor and gas


Stoma appearance and function


Showering


Traveling


Obtaining supplies


Additional information


Patient seen on [facility] for ostomy assessment and teaching. Stoma is red, moist, 

moderately protruding, lumen noted at 3 o'clock with mucus noted to stoma, dark 

brown liquid effluent noted in pouch was not emptied by writer. Appliance 

intact and noted without leaks.











[facility] Feb 12, 2018 16:11

## 2018-02-12 NOTE — MP
cc:

LEO DE LA O M.D.

****

 

 

DATE OF SURGERY

February 9, 2018

 

 

PREOPERATIVE DIAGNOSIS

Rectal cancer.

 

POSTOPERATIVE DIAGNOSIS

Rectal cancer.

 

PROCEDURE

1. Robotic extensive lysis of adhesions.

2. Robotic abdominoperineal resection.

 

SURGEON

Leo De La O MD

 

ASSISTANT

[place]

 

ANESTHESIA

General per ET tube.

 

ESTIMATED BLOOD LOSS

100 cc.

 

OPERATIVE INDICATIONS

The patient is a 57-year-old female with rectal cancer who is not quite three

months out from neoadjuvant chemoradiation.

 

OPERATIVE FINDINGS

The patient had a residual ulcer just above the anus anteriorly with a few

small areas suggestive of residual cancer.  This was fairly adherent to the

posterior vagina.  There were no visible abnormalities outside of the rectum or

in the liver, although I was not able to visualize the entire liver.

 

OPERATIVE COURSE

The patient was brought to the operating room, placed in the supine position

after induction of general anesthesia.  The patient was placed in Emil

stirrups and all bony prominences were carefully padded.  The skin of the

anterior abdominal wall as well as the perineal area was then prepped and

draped in the usual sterile fashion.

 

Dr. Meyers then came in and performed cystoscopy with placement of bilateral

ureteral catheters, please see his operative note for details.

 

A site was then chosen for our initial trocar under the right costal margin due

to her previous incision.  A #5 trocar was placed under direct vision using a

laparoscope.  CO2 insufflation was then undertaken and a brief abdominal survey

was performed.  The patient was immediately noted to have large amounts of

adhesions of mesentery and small bowel to the anterior abdominal wall.  The

camera/umbilical port was not clear enough to be placed so I elected to place

the #1 right lower quadrant port.  The 10/12 port was placed just inside the

right anterior superior iliac spine and the adhesions were slowly and

painstakingly dissected free from the peritoneal surface.  Eventually we had

these open enough that we were able to place the camera port.  This was placed

just above and to the right of the umbilicus.  Upon make our incision and

dissecting down to the anterior abdominal wall, we did get into what appeared

to be an old hematoma and drained this.  The patient was hydroplaned with the

head down and to the right.  The small bowel was brought up and out of the

pelvis and some additional adhesions were dissected free. The remainder of the

ports were placed with a #3 port in line with the umbilicus and the left

anterior axillary line and the #2 port in the left midclavicular line two

fingerbreadths above the umbilical line.  The robot was then docked.

 

The sigmoid colon was retracted down and to the left and the peritoneum was

scored on the right.  Dissection was continued in this plane posterior to the

inferior mesenteric vessels until the left ureter was clearly identified and

swept away from the specimen.  Dissection then continued down into the

posterior pelvis.  The vessels were then dissected back to their takeoff from

the aorta and the artery and vein were carefully dissected free.  These were

doubly clamped proximally with Hem-o-tejal clamps and singly clamped distally.

The lateral adhesions of the sigmoid colon and rectum to the left lateral

sidewall were then dissected free, continuing this plane until the left ureter

was clearly identified and we met our previous dissection.  Dissection

continued posteriorly down both lateral sides of the pelvis, slowly dissecting

free the rectum down posteriorly, down to the level of the pelvic floor.

Anteriorly the rectum was noted to be quite adherent to the posterior vagina

but we slowly and painstakingly dissected this free as far as I felt was

possible.  Eventually we had full mobility of the descending, sigmoid colon and

rectum.

 

A site was then chosen for the stoma just at the proximal sigmoid.  The

mesentery at this level was divided using the harmonic scalpel and the [place]

and staples placed across the bowel at this level.  This was fired and the

bowel was divided.  A clamp was placed on the proximal end of the bowel to help

us find the area in question.  All dissection beds were then examined and there

was no significant bleeding at this point.

 

The robot was then undocked and we proceeded with the perineal portion of the

procedure.  An incision was made around the anus and using electrocautery

dissection was carried down into the intersphincteric plane leaving a few

fibers of the external muscle for closure.  Eventually I was able to enter my

previous dissection plane in the pelvis posteriorly and I dissected this around

to the front.  Due to the residual tumor, I did take a tiny strip of vagina

posteriorly in order to assure that we had adequate margins.  Eventually we

were able to reverse the bowel, bring it out and the specimen was then sent for

pathology.

 

The perineal incision was then irrigated with warm normal saline and hemostasis

obtained with electrocautery.  The posterior vagina was closed in a running

fashion using 0 Vicryl and the levator ani muscles were reapproximated in

interrupted fashion using #1 PDS.  The tissue overlying the levators was then

reapproximated in an interrupted figure-of-eight fashion using 2-0 Vicryl and

the skin was closed in an interrupted subcuticular fashion using 3-0 Vicryl.

Three additional mattress sutures of 3-0 nylon were placed to reinforce the

suture.  The CO2 insufflation was then resumed and we returned to the abdominal

portion of the procedure.  All dissection beds were examined and there was no

sign of any significant bleeding.  We did, however, dust some Surgicel powder

into the pelvis.  The right lower quadrant 10/12 trocar site was closed using

the crossbow device and 0 Vicryl suture.  Attempts were made to close the

umbilical site but due to the mesh that was present this was not possible.

However, it was a very small incision and it was at somewhat of an angle, so I

felt that the risk of herniation was quite small.

A site was then chosen for the stoma being located approximately one-third to

one-half of the way from the umbilicus to the left anterior superior iliac

spine.  A 2 cm ellipse of skin was removed sharply and the preperitoneal fat

was removed using electrocautery.  She had a very thick abdominal wall at this

portion.  The anterior fascia of the abdominal wall was then incised at 2 cm

vertically and the fibers of the rectus abdominis muscle were split.  The

posterior fascia was then incised the length of the skin incision.  The stomal

aperture was then gently dilated.  The proximal stapled end of the bowel was

then grasped and pulled up and out through the incision.  She had quite a bit

of epiploica and fatty tissue around the colon but we were gradually and slowly

able to bring it out so that it would make a nice stoma.  Some of the epiploica

were then removed using electrocautery.  The stoma was then matured in Rebecca

fashion using 3-0 Vicryl.

 

The remaining trocar sites were copiously irrigated with warm normal saline and

they were closed in interrupted subcuticular fashion using 3-0 Vicryl.

Steri-Strips and sterile dressing was then applied.  A stomal appliance was

applied.  The right ureteral stent was removed.

 

All sponge, needle and instrument counts were correct and the patient was

returned to the post-anesthesia care in stable condition Wednesday.

 

 

 

 

                              _________________________________

                              MD LACIE Tavares/SAE

D:  2/9/2018/9:40 PM

T:  2/12/2018/6:19 AM

Visit #:  [id number]

Job #:  [id number]

## 2018-02-13 VITALS
HEART RATE: 81 BPM | SYSTOLIC BLOOD PRESSURE: 121 MMHG | OXYGEN SATURATION: 97 % | RESPIRATION RATE: 18 BRPM | DIASTOLIC BLOOD PRESSURE: 66 MMHG | TEMPERATURE: 96.8 F

## 2018-02-13 VITALS
RESPIRATION RATE: 18 BRPM | SYSTOLIC BLOOD PRESSURE: 129 MMHG | DIASTOLIC BLOOD PRESSURE: 59 MMHG | HEART RATE: 84 BPM | TEMPERATURE: 97.3 F | OXYGEN SATURATION: 97 %

## 2018-02-13 VITALS
RESPIRATION RATE: 17 BRPM | SYSTOLIC BLOOD PRESSURE: 130 MMHG | HEART RATE: 92 BPM | DIASTOLIC BLOOD PRESSURE: 72 MMHG | TEMPERATURE: 97.6 F | OXYGEN SATURATION: 99 %

## 2018-02-13 VITALS
RESPIRATION RATE: 18 BRPM | SYSTOLIC BLOOD PRESSURE: 119 MMHG | DIASTOLIC BLOOD PRESSURE: 72 MMHG | TEMPERATURE: 96.5 F | HEART RATE: 76 BPM | OXYGEN SATURATION: 97 %

## 2018-02-13 VITALS — OXYGEN SATURATION: 97 %

## 2018-02-13 RX ADMIN — DEXTROSE MONOHYDRATE, SODIUM CHLORIDE, AND POTASSIUM CHLORIDE SCH MLS/HR: 50; 9; 1.49 INJECTION, SOLUTION INTRAVENOUS at 09:40

## 2018-02-13 RX ADMIN — HEPARIN SODIUM SCH UNITS: 10000 INJECTION, SOLUTION INTRAVENOUS; SUBCUTANEOUS at 09:40

## 2018-02-13 RX ADMIN — PANTOPRAZOLE SODIUM SCH MG: 40 INJECTION, POWDER, FOR SOLUTION INTRAVENOUS at 09:39

## 2018-02-13 NOTE — PD.WCN.NOT
Wound Consult


Description:


Consult for NEW OSTOMY TEACHING of LLQ per Dr De La O


Communicated with:


YEIMI Serrano


Patient


Patient  at bedside


Recommendation:


Empty pouch when 1/3-1/2 full 


Change appliance (barrier and pouch) every 5-7 days and PRN for leaks


Use 2 3/4" appliance until stoma shrinks to size <1 1/4"


Additional Information:


Patient seen on [address] [place] [address] for ostomy assessment, teaching, and appliance change.





Ostomy


Type:  Colostomy


Surgeon:  Gena De La O MD


Date of Surgery:  Feb 9, 2018


Complete:  Starter kit (kit sent today via [employer] ), Education materials 

(left at bedside), Rx (2 sizes left on chart. 2 3/4" appliance to use now and 2 

1/4" to use starting in March once stoma has shrunk in size), Other (supplies 

ordered from [facility] for patient to go home with at discharge)


Educated patient on:


How to remove wafer using adhesive remover wipes


How to cleanse around stoma


Sutures will disintegrate in approximately 3-4 weeks


How to measure stoma using template


Stoma should be red, moist, moderately protruding, functioning every 24-48 hours


How to mold wafer to fit stoma


How to warm wafer for adhesion


How to apply pouch to flange


How to close the pouch


Additional information


Patient seen on [address] [place] [address] for ostomy assessment, teaching, and appliance change 

using 2 3/4" moldable wafer and open ended pouch. Patient states that she will 

want to use the closed ended pouch upon discharge to home.  Entire wafer and 

pouch was changed today with patient and . All questions were answered. 

If patient has any further questions she may ask her nurse to vocera writer 

today before 3:30 pm. Patient will be followed up on tomorrow if still in 

house. Patient had questions regarding HHC that were answered as well as 

informing patient  about [employer] delivering starter kit on Wednesday. 

Supplies were ordered from [facility] for patient to go home with at discharge.











Carol Bob Feb 13, 2018 11:07

## 2018-02-13 NOTE — HHI.FF
Face to Face Verification


Diagnosis:  


(1) Rectal cancer


Home Health Nursing








Order: Wound care and dressing changes

















I have seen patient Mala Thomas on 2/13/18. My clinical findings support 

the need for the requested home health care services because:








 Deconditioned w/ increased weakness














I certify that my clinical findings support that this patient is homebound 

because:








 Post-op weakness (New stoma)

















Gena De La O MD Feb 13, 2018 10:42

## 2018-02-13 NOTE — HHI.PR
Subjective


Remarks


POD#4 s/p robotic APR, ANYA





comfortable





Objective





Vital Signs








  Date Time  Temp Pulse Resp B/P (MAP) Pulse Ox O2 Delivery O2 Flow Rate FiO2


 


2/13/18 09:45     97   


 


2/13/18 08:00 96.5 76 18 119/72 (88) 97   


 


2/13/18 08:00 96.5 76 18 119/72 (88) 97   


 


2/13/18 00:00 96.8 81 18 121/66 (84) 97   


 


2/12/18 20:00 99.0 88 18 117/55 (75) 97   


 


2/12/18 15:00 97.9 87 18 135/70 (91) 99   


 


2/12/18 11:00 97.9 74 17 125/61 (82) 99   














I/O      


 


 2/12/18 2/12/18 2/12/18 2/13/18 2/13/18 2/13/18





 07:00 15:00 23:00 07:00 15:00 23:00


 


Intake Total 500 ml  480 ml 240 ml  


 


Output Total 2300 ml  1600 ml 300 ml  


 


Balance -1800 ml  -1120 ml -60 ml  


 


      


 


Intake Oral 500 ml  480 ml 240 ml  


 


Output Urine Total 2100 ml  1600 ml 200 ml  


 


Stool Total 200 ml   100 ml  


 


# Sanitary Pads 2 Pads 1 Pads 1 Pads   





  1 Pads 0 Pads   








Result Diagram:  


2/12/18 0426 2/12/18 0426





Objective Remarks


Abdomen soft, mild distension, tender


Wounds clean


Perineal wound - serous drainage, mild





Assessment and Plan


Assessment and Plan


Home when [facility] arranged


Followup with me 3 weeks











Gena De La O MD Feb 13, 2018 10:52